# Patient Record
Sex: FEMALE | Race: WHITE | NOT HISPANIC OR LATINO | Employment: FULL TIME | URBAN - METROPOLITAN AREA
[De-identification: names, ages, dates, MRNs, and addresses within clinical notes are randomized per-mention and may not be internally consistent; named-entity substitution may affect disease eponyms.]

---

## 2018-04-10 ENCOUNTER — OFFICE VISIT (OUTPATIENT)
Dept: URGENT CARE | Facility: CLINIC | Age: 28
End: 2018-04-10
Payer: COMMERCIAL

## 2018-04-10 VITALS
WEIGHT: 190 LBS | HEIGHT: 67 IN | RESPIRATION RATE: 16 BRPM | TEMPERATURE: 98 F | BODY MASS INDEX: 29.82 KG/M2 | OXYGEN SATURATION: 99 % | HEART RATE: 67 BPM | SYSTOLIC BLOOD PRESSURE: 123 MMHG | DIASTOLIC BLOOD PRESSURE: 67 MMHG

## 2018-04-10 DIAGNOSIS — J06.9 URI WITH COUGH AND CONGESTION: ICD-10-CM

## 2018-04-10 DIAGNOSIS — R05.9 COUGH: Primary | ICD-10-CM

## 2018-04-10 DIAGNOSIS — L72.9 BENIGN SKIN CYST: ICD-10-CM

## 2018-04-10 LAB
CTP QC/QA: YES
FLUAV AG NPH QL: NEGATIVE
FLUBV AG NPH QL: NEGATIVE

## 2018-04-10 PROCEDURE — 99203 OFFICE O/P NEW LOW 30 MIN: CPT | Mod: S$GLB,,, | Performed by: NURSE PRACTITIONER

## 2018-04-10 PROCEDURE — 87804 INFLUENZA ASSAY W/OPTIC: CPT | Mod: QW,S$GLB,, | Performed by: NURSE PRACTITIONER

## 2018-04-10 RX ORDER — MUPIROCIN 20 MG/G
OINTMENT TOPICAL
Qty: 22 G | Refills: 1 | Status: SHIPPED | OUTPATIENT
Start: 2018-04-10

## 2018-04-10 RX ORDER — ALBUTEROL SULFATE 90 UG/1
2 AEROSOL, METERED RESPIRATORY (INHALATION) EVERY 6 HOURS PRN
Qty: 18 G | Refills: 0 | Status: SHIPPED | OUTPATIENT
Start: 2018-04-10

## 2018-04-10 RX ORDER — BENZONATATE 200 MG/1
200 CAPSULE ORAL 3 TIMES DAILY PRN
Qty: 30 CAPSULE | Refills: 0 | Status: SHIPPED | OUTPATIENT
Start: 2018-04-10

## 2018-04-10 NOTE — PROGRESS NOTES
"Subjective:       Patient ID: Shaista Gan is a 27 y.o. female.    Vitals:    04/10/18 1751   BP: 123/67   Pulse: 67   Resp: 16   Temp: 97.8 °F (36.6 °C)   SpO2: 99%   Weight: 86.2 kg (190 lb)   Height: 5' 7" (1.702 m)       Chief Complaint: Cough    Pt states productive cough, chest congestion, sore throat, body aches, and chills x 2-3 days. Pt states she had the flu in Feb., 2018. Pt reports wheezing the last 2 nights. Pt also states that she needs a dermatologist here for a cyst on her left thigh. She says she used to get injections in it from her derm provider in [place].      Cough   This is a new problem. The current episode started in the past 7 days. The problem has been gradually worsening. The cough is productive of sputum. Associated symptoms include ear congestion, myalgias, postnasal drip and a sore throat. Pertinent negatives include no chest pain, chills, ear pain, eye redness, fever, headaches, shortness of breath or wheezing. Nothing aggravates the symptoms. She has tried nothing for the symptoms.     Review of Systems   Constitution: Negative for chills, fever and malaise/fatigue.   HENT: Positive for congestion, postnasal drip and sore throat. Negative for ear pain and hoarse voice.    Eyes: Negative for discharge and redness.   Cardiovascular: Negative for chest pain, dyspnea on exertion and leg swelling.   Respiratory: Positive for cough and sputum production. Negative for shortness of breath and wheezing.    Musculoskeletal: Positive for myalgias.   Gastrointestinal: Negative for abdominal pain and nausea.   Neurological: Negative for headaches.       Objective:      Physical Exam   Constitutional: She is oriented to person, place, and time. She appears well-developed and well-nourished. She is cooperative.  Non-toxic appearance. She does not appear ill. No distress.   HENT:   Head: Normocephalic and atraumatic.   Right Ear: Hearing, tympanic membrane, external ear and ear canal normal.   Left Ear: " Hearing, tympanic membrane, external ear and ear canal normal.   Nose: Rhinorrhea present. No mucosal edema or nasal deformity. No epistaxis. Right sinus exhibits no maxillary sinus tenderness and no frontal sinus tenderness. Left sinus exhibits no maxillary sinus tenderness and no frontal sinus tenderness.   Mouth/Throat: Uvula is midline, oropharynx is clear and moist and mucous membranes are normal. No trismus in the jaw. Normal dentition. No uvula swelling. No posterior oropharyngeal erythema.   Eyes: Conjunctivae and lids are normal. Pupils are equal, round, and reactive to light. No scleral icterus.   Sclera clear bilat   Neck: Trachea normal, full passive range of motion without pain and phonation normal. Neck supple.   Cardiovascular: Normal rate, regular rhythm, normal heart sounds, intact distal pulses and normal pulses.    Pulmonary/Chest: Effort normal and breath sounds normal. No respiratory distress. She has no decreased breath sounds. She has no wheezes.   Abdominal: Soft. Normal appearance and bowel sounds are normal. She exhibits no distension. There is no tenderness.   Musculoskeletal: Normal range of motion. She exhibits no edema or deformity.   Neurological: She is alert and oriented to person, place, and time. She exhibits normal muscle tone. Coordination normal.   Skin: Skin is warm, dry and intact. No ecchymosis, no laceration, no lesion and no rash noted. She is not diaphoretic. There is erythema. No pallor.   3cm cyst to right thigh that is open and draining serosanguinous fluid that is not warm or indurated   Psychiatric: She has a normal mood and affect. Her speech is normal and behavior is normal. Judgment and thought content normal. Cognition and memory are normal.   Nursing note and vitals reviewed.      Results for orders placed or performed in visit on 04/10/18   POCT Influenza A/B   Result Value Ref Range    Rapid Influenza A Ag Negative Negative    Rapid Influenza B Ag Negative  Negative     Acceptable Yes      Assessment:       1. Cough    2. URI with cough and congestion    3. Benign skin cyst        Plan:       Shaista was seen today for cough.    Diagnoses and all orders for this visit:    Cough  -     POCT Influenza A/B    URI with cough and congestion  -     benzonatate (TESSALON) 200 MG capsule; Take 1 capsule (200 mg total) by mouth 3 (three) times daily as needed.  -     albuterol 90 mcg/actuation inhaler; Inhale 2 puffs into the lungs every 6 (six) hours as needed for Wheezing. Rescue    Benign skin cyst  -     mupirocin (BACTROBAN) 2 % ointment; Apply to affected area 3 times daily  -     Ambulatory referral to Dermatology      Viral Upper Respiratory Illness with Wheezing (Adult)  You have a viral upper respiratory illness (URI), which is another term for the common cold. When the infection causes a lot of irritation, the air passages can go into spasm. This causes wheezing and shortness of breath.    This illness is contagious during the first few days. It is spread through the air by coughing and sneezing. It may also be spread by direct contact (touching the sick person and then touching your own eyes, nose, or mouth). Frequent handwashing will decrease the risk.  Most viral illnesses go away within 7 to 10 days with rest and simple home remedies. Sometimes the illness may last for several weeks. Antibiotics will not kill a virus, and they are generally not prescribed for this condition.  Home care  · If symptoms are severe, rest at home for the first 2 to 3 days. When you resume activity, don't let yourself get too tired.  · Avoid being exposed to cigarette smoke (yours or others).  · You may use acetaminophen or ibuprofen to control pain and fever, unless another medicine was prescribed. (Note: If you have chronic liver or kidney disease, have ever had a stomach ulcer or gastrointestinal bleeding, or are taking blood-thinning medicines, talk with your  healthcare provider before using these medicines.) Aspirin should never be given to anyone under 18 years of age who is ill with a viral infection or fever. It may cause severe liver or brain damage.  · Your appetite may be poor, so a light diet is fine. Avoid dehydration by drinking 6 to 8 glasses of fluids per day (water, soft drinks, juices, tea, or soup). Extra fluids will help loosen secretions in the nose and lungs.  · Over-the-counter cold medicines will not shorten the length of time youre sick, but they may be helpful for the following symptoms: cough, sore throat, and nasal and sinus congestion. (Note: Do not use decongestants if you have high blood pressure.)  Follow-up care  Follow up with your healthcare provider, or as advised.  When to seek medical advice  Call your healthcare provider right away if any of these occur:  · Cough with lots of colored sputum (mucus)  · Severe headache; face, neck, or ear pain  · Difficulty swallowing due to throat pain  · Fever of 100.4ºF (38ºC) or higher, or as directed by your healthcare provider  Call 911, or get immediate medical care  Call emergency services right away if any of these occur:  · Chest pain, shortness of breath, worsening wheezing, or difficulty breathing  · Coughing up blood  · Inability to swallow due to throat pain  Date Last Reviewed: 9/13/2015 © 2000-2017 [employer]. [address] [place] [id number]. All rights reserved. This information is not intended as a substitute for professional medical care. Always follow your healthcare professional's instructions.    SOMEONE WILL BE CALLING YOU REGARDING YOUR REFERRAL TO DERMATOLOGY     USE BACTROBAN 3 TIMES A DAY FOR 3 DAYS    RETURN TO CARE IF YOU RUN FEVER OR CYST BECOMES INFECTED

## 2018-04-10 NOTE — PATIENT INSTRUCTIONS
Viral Upper Respiratory Illness with Wheezing (Adult)  You have a viral upper respiratory illness (URI), which is another term for the common cold. When the infection causes a lot of irritation, the air passages can go into spasm. This causes wheezing and shortness of breath.    This illness is contagious during the first few days. It is spread through the air by coughing and sneezing. It may also be spread by direct contact (touching the sick person and then touching your own eyes, nose, or mouth). Frequent handwashing will decrease the risk.  Most viral illnesses go away within 7 to 10 days with rest and simple home remedies. Sometimes the illness may last for several weeks. Antibiotics will not kill a virus, and they are generally not prescribed for this condition.  Home care  · If symptoms are severe, rest at home for the first 2 to 3 days. When you resume activity, don't let yourself get too tired.  · Avoid being exposed to cigarette smoke (yours or others).  · You may use acetaminophen or ibuprofen to control pain and fever, unless another medicine was prescribed. (Note: If you have chronic liver or kidney disease, have ever had a stomach ulcer or gastrointestinal bleeding, or are taking blood-thinning medicines, talk with your healthcare provider before using these medicines.) Aspirin should never be given to anyone under 18 years of age who is ill with a viral infection or fever. It may cause severe liver or brain damage.  · Your appetite may be poor, so a light diet is fine. Avoid dehydration by drinking 6 to 8 glasses of fluids per day (water, soft drinks, juices, tea, or soup). Extra fluids will help loosen secretions in the nose and lungs.  · Over-the-counter cold medicines will not shorten the length of time youre sick, but they may be helpful for the following symptoms: cough, sore throat, and nasal and sinus congestion. (Note: Do not use decongestants if you have high blood pressure.)  Follow-up  care  Follow up with your healthcare provider, or as advised.  When to seek medical advice  Call your healthcare provider right away if any of these occur:  · Cough with lots of colored sputum (mucus)  · Severe headache; face, neck, or ear pain  · Difficulty swallowing due to throat pain  · Fever of 100.4ºF (38ºC) or higher, or as directed by your healthcare provider  Call 911, or get immediate medical care  Call emergency services right away if any of these occur:  · Chest pain, shortness of breath, worsening wheezing, or difficulty breathing  · Coughing up blood  · Inability to swallow due to throat pain  Date Last Reviewed: 9/13/2015 © 2000-2017 [employer]. [address] [place] [address], [place] [address]. All rights reserved. This information is not intended as a substitute for professional medical care. Always follow your healthcare professional's instructions.    SOMEONE WILL BE CALLING YOU REGARDING YOUR REFERRAL TO DERMATOLOGY     USE BACTROBAN 3 TIMES A DAY FOR 3 DAYS    RETURN TO CARE IF YOU RUN FEVER OR CYST BECOMES INFECTED

## 2023-07-27 PROBLEM — Z00.00 ENCOUNTER FOR PREVENTIVE HEALTH EXAMINATION: Status: ACTIVE | Noted: 2023-07-27

## 2023-08-01 ENCOUNTER — APPOINTMENT (OUTPATIENT)
Dept: OBGYN | Facility: CLINIC | Age: 33
End: 2023-08-01
Payer: COMMERCIAL

## 2023-08-01 VITALS — DIASTOLIC BLOOD PRESSURE: 64 MMHG | HEIGHT: 67 IN | SYSTOLIC BLOOD PRESSURE: 108 MMHG

## 2023-08-01 DIAGNOSIS — N76.0 ACUTE VAGINITIS: ICD-10-CM

## 2023-08-01 DIAGNOSIS — L29.2 PRURITUS VULVAE: ICD-10-CM

## 2023-08-01 DIAGNOSIS — B96.89 ACUTE VAGINITIS: ICD-10-CM

## 2023-08-01 PROCEDURE — 56820 COLPOSCOPY VULVA: CPT

## 2023-08-01 PROCEDURE — 81001 URINALYSIS AUTO W/SCOPE: CPT

## 2023-08-01 PROCEDURE — 87210 SMEAR WET MOUNT SALINE/INK: CPT | Mod: QW

## 2023-08-01 PROCEDURE — 99024 POSTOP FOLLOW-UP VISIT: CPT

## 2023-08-01 RX ORDER — TINIDAZOLE 500 MG/1
500 TABLET, FILM COATED ORAL
Qty: 20 | Refills: 0 | Status: ACTIVE | COMMUNITY
Start: 2023-08-01 | End: 1900-01-01

## 2023-08-01 NOTE — HISTORY OF PRESENT ILLNESS
[TextEntry] : ***The patient was asked about all of the following, positive responses are listed below*** Gynecologic History: History of breast biopsy or breast cyst aspiration; Pain during or after intercourse; Vaginal bleeding or spotting between periods; Breast discharge; Abnormal or irregular periods; Abnormal vaginal discharge; history of Gynecologic cancer; history of Ovarian cysts; history of Fibroids; history of frequent Urinary tract infections; Urinary problems (i.e. frequency, urgency, difficulty, leaking); history of Pelvic pain/pelvic inflammatory disease; history of chronic Vaginal infections (i.e., yeast, Trichomonas, bacterial Vaginosis) Contraceptive History: What she is currently using for birth control; If she was requesting birth control today. Sexually Transmitted Disease History: A history of any of the following sexually transmitted diseases: Gonorrhea; Chlamydia; Syphilis; Herpes; HPV/Warts; Hepatitis; HIV/AIDS and whether she feels that she is at risk for HIV/AIDS and wants to be tested for HIV. Abnormal Pap Smear History: History of abnormal Pap smear; evaluation of any abnormalities; treatment of any abnormalities Date and result of her last Pap smear. SCOTT in Utero Exposure History: A history of personal or maternal SCOTT exposure. Hospitalizations (other than childbirth) Blood transfusions. Review of Systems: General: weight change, general health; Head: headaches, vertigo; Eyes: vision, diplopia; Ears: change in hearing, tinnitus; Nose: epistaxis, chronic rhinitis; Mouth: gingival bleeding; Neck: stiffness, pain, masses; Chest: dyspnea, wheezing, hemoptysis, persistent cough; Heart: chest pains, palpitations; Abdomen: liver disease, abdominal discomfort, Indigestion, Nausea/Vomiting, Constipation/Diarrhea, Blood in stool, change in bowel habits; : renal disease; Musculoskeletal: pain in muscles or joints, paresthesias or numbness; Skin: rashes, itching, pigmentation changes; Neurologic: weakness, tremor, seizures, changes in mentation; Psychiatric: depressive symptoms, changes in sleep habits. Surgery: History of gynecological surgery; History of non-gynecological surgery.  ***Pregnancy History*** # Pregnancies ~  0~   ***Menstrual History*** Age of first period: ~ 13 ~ ; # of days between cycles: Amenorrhea on Kyleena IUD; duration of period: Spotting days; she gets cramps with periods; uses nothing to relieve cramps.  ***Sexual history*** She is not sexually active; [place]: ~ 15 ~ ; Total # of lifetime partners: ~ 30 ~ ;; Date of last sexual contact (if not currently sexually active): ~2023  ~   Medical history - Alcohol abuse; Anemia; Anxiety; Arthritis/Lupus; Asthma; Blood clots in legs; Blood disorders; Breast problems; Cancer; Colitis; Depression; Diabetes; Drug Abuse; Eating disorder; Elevated cholesterol; Epilepsy/seizures; Eye problems/glaucoma; Gall bladder disease; Gout; Head or neck radiation; Hearing problems; Heart Disease; Hemorrhoid; Hepatitis or jaundice; High blood pressure; HIV/AIDS; Kidney disease; Low back problems; Neurological disorders; Osteoporosis; Other; Pneumonia; Rheumatic fever; Skin diseases; Stroke; Thyroid disease; Ulcers Family History - Cancers of the Breast; Cervix; Uterus; Ovarian; Lung; Colon; Other Vaccination Status - Whether she had all the usual childhood vaccinations or illness (negative answers recorded); Whether she was not vaccinated for HPV (All 3 doses) Social History - Current or past cigarette smoking; current or past alcohol abuse; current or past Marijuana use; current or past Cocaine use; current or past abuse of any other illegal or prescription drugs; current employment.  ***Preventative Care*** Date of the patient's last: Pap smear: ~3/2023  ~  Breast exam: ~3/2023  ~  ***Social History*** The patient was asked about all of the following; positive responses are listed below: current or past cigarette smoking; Alcohol; current or past Marijuana use; current or past Cocaine use; current or past abuse of any other illegal or prescription drugs.   Employment: ~ Sales ~  The patient is 32 years old  0 whose last menstrual period was a long time ago.  The patient is on a Kyleena IUD.  About 1-1/2 years ago, the patient developed vulvar burning and itching.  She went to an urgent care center and was given fluconazole and Monistat with slight relief.  Since then, her symptoms have been almost constant for the past 1.5 years.  She has been given fluconazole in 1-2 other occasions.  In 2023 she used boric acid suppositories for 10 days without relief.  She took Brexafemme in May 2023 and had relief for about 1 month before the symptoms returned.  She found that stopping wine gave her relief for a few weeks but she went back to drinking wine.  She has tested positive for both Candida and BV 3-4 times this year.  She complains of a vaginal odor.  Her BV has been treated with both oral and vaginal medications but that she does not know what they are.  She notes that she scratches and rubs a lot.  Today her symptoms of slight irritation and itchiness with a milky discharge and an odor.  She last used Brexafemme in May 2023 and has been using a topical triple antibiotic ointment recently. ***Significant positives*** She was not taking any medication at the time of the visit. She reports allergy to ~ Ceclor (hives), eggs ~ . She denied any other medication allergies at the time of the visit. Her past gynecological history is not significant. She is not sexually active and uses ~ intrauterine device ~  for birth control. She denies a history of abnormal Pap tests. Her last Pap test was ~ 3/2023 ~  and was normal. Her past medical and surgical histories are not significant. She has ~not  ~  been hospitalized. She was ~  ~  vaccinated for HPV. A 20-point review of systems was significant for ~fatigue, indigestion, constipation/diarrhea  ~ . Her review of systems was not otherwise significant. Her familys cancer history is significant for ~mother with breast cancer  ~  Her family's cancer history is not otherwise significant. She smokes 0.5 packs per day for 15 years. In the past she has used marijuana and cocaine.

## 2023-08-01 NOTE — PHYSICAL EXAM
[Chaperone Present] : A chaperone was present in the examining room during all aspects of the physical examination [TextEntry] : ***General*** General Appearance: normal; Judgment and insight: normal; the patient is oriented to time, place and person; Recent and remote memory: normal; Mood and affect: normal; Respiratory effort: normal.   ***Pelvic Examination*** External genitalia: the appearance and hair distribution are normal; no lesions are appreciated. Urethra/urethral meatus: no masses or tenderness are appreciated; there is no scarring noted. Bladder: nontender; there is no fullness appreciated; no masses were palpated. Vagina: the vagina is normally; a milky white discharge is seen; no lesions are seen; pelvic support is adequate without any evidence of cystocele or rectocele. Cervix: normal in appearance; no overt lesions are seen. Uterus: Anteverted; normal in size, mobile, nontender, and well supported. Adnexa/parametria: nontender and not enlarged; no masses are palpated. A stool specimen was not indicated at this time.  ***Vaginal Discharge Evaluation***  A saline wet mount and KOH slide evaluation of the vaginal discharge were done. The discharge was milky and white; the pH was elevated; the whiff test was positive; a few clue cells were seen; hyphae were not seen; no lactobacilli were seen; no white blood cells were seen; superficial and intermediate cells were seen; a candida culture was sent.  ***colposcopy of the vulva*** Colposcopy of the external genitalia showed that the labia majora and labia minora were normal. She identified the hairbearing skin of the labia majora as the location of her pain/itching/irritation. There was 6/10 vestibular tenderness of the anterior minor vestibular glands, and 7/10 vestibular tenderness of the posterior minor vestibular glands. There was no evidence of other dermatopathology. There was moderate erythema of the introitus.

## 2023-08-01 NOTE — PLAN
[FreeTextEntry1] : I recommended that she call for the results of her yeast culture in 1 week.  I recommended that she avoid irritants and gave her a list of irritants to avoid.  I told her to try using Vaseline to improve the vulvas barrier function.  I recommended Tindamax 500 mg BID X 10 days.  She should refrain from sex while using Tindamax.  I gave her boric acid suppositories qhs X 24 days.  She should not  use the suppositories during heavy days of menses but should use them if she has only spotting or staining.  Sex is acceptable during the days that she is using the boric acid suppositories alone (not the first 10 days that she is using them with Tindamax) but only with a condom.  She should return 3 days after the last boric acid suppository.

## 2023-08-01 NOTE — ASSESSMENT
[TextEntry] : The patient has had vulvar itching and irritation with a milky vaginal discharge and a fishy odor for the past 1.5 years.  She has been variously treated with fluconazole, Monistat, Brexafemme, boric acid suppositories and oral and vaginal medications for BV.  She has BV today but despite the fact that there is erythema of the introitus and labia minora, there is no evidence of Candida (pending culture). 52 minutes of total time was spent on the date of the encounter. This included time for review of medical records and laboratory results, face to face time (including the physical examination counseling and coordination of care), time for patient education, treatment plans, answering questions, communicating with other doctors and for medical record documentation.  The time spent is separate from time spent on separately billed procedures.

## 2023-08-14 ENCOUNTER — NON-APPOINTMENT (OUTPATIENT)
Age: 33
End: 2023-08-14

## 2023-08-28 ENCOUNTER — APPOINTMENT (OUTPATIENT)
Dept: OBGYN | Facility: CLINIC | Age: 33
End: 2023-08-28
Payer: COMMERCIAL

## 2023-08-28 VITALS — SYSTOLIC BLOOD PRESSURE: 100 MMHG | DIASTOLIC BLOOD PRESSURE: 60 MMHG

## 2023-08-28 DIAGNOSIS — B37.31 ACUTE CANDIDIASIS OF VULVA AND VAGINA: ICD-10-CM

## 2023-08-28 PROCEDURE — 99213 OFFICE O/P EST LOW 20 MIN: CPT | Mod: 25

## 2023-08-28 PROCEDURE — 56820 COLPOSCOPY VULVA: CPT

## 2023-08-28 PROCEDURE — 81001 URINALYSIS AUTO W/SCOPE: CPT

## 2023-08-28 PROCEDURE — 87210 SMEAR WET MOUNT SALINE/INK: CPT | Mod: QW

## 2023-08-28 RX ORDER — IBREXAFUNGERP 150 MG/1
150 TABLET, FILM COATED ORAL
Qty: 1 | Refills: 0 | Status: ACTIVE | COMMUNITY
Start: 2023-08-28 | End: 1900-01-01

## 2023-08-28 NOTE — HISTORY OF PRESENT ILLNESS
[FreeTextEntry1] : The patient had chronic BV.  At her last visit, she also had fluconazole sensitive Candida.  She used Tindamax and boric acid suppositories for 10 days followed by boric acid suppositories alone for 13 days.  Her odor and discharge have resolved but she continues to have vulvar irritation.

## 2023-08-28 NOTE — PLAN
[FreeTextEntry1] : I recommended that she call for the results of her yeast culture sensitivities in 1 week.  I gave her Brexafemme 300 mg p.o. twice daily for 1 day.  I recommended that she use boric acid suppositories 3 times weekly as prophylaxis for both Candida and BV for at least 6 months.  She should return as needed.

## 2023-08-28 NOTE — ASSESSMENT
[TextEntry] : The patient had both BV and Candida.  She was treated with 10 days of Tindamax and 23 days of boric acid suppositories.  The BV has resolved with reestablishment of lactobacilli but she still has Candida.  Her last Candida infection was sensitive to fluconazole (NELLY=1) but she reports that she has not responded to fluconazole in the past.  She has responded to Brexafemme in the past but the infection recurred after 3 weeks. 26 minutes of total time was spent on the date of the encounter. This included time for review of medical records and laboratory results, face to face time (including the physical examination counseling and coordination of care), time for patient education, treatment plans, answering questions, communicating with other doctors and for medical record documentation.  The time spent is separate from time spent on separately billed procedures.

## 2023-08-28 NOTE — PHYSICAL EXAM
[Chaperone Present] : A chaperone was present in the examining room during all aspects of the physical examination [TextEntry] : ***General*** General Appearance: normal; Judgment and insight: normal; the patient is oriented to time, place and person; Recent and remote memory: normal; Mood and affect: normal; Respiratory effort: normal.  ***Pelvic Examination*** External genitalia: the appearance and hair distribution are normal; no lesions are appreciated. Urethra/urethral meatus: no masses or tenderness are appreciated; there is no scarring noted. Bladder: nontender; there is no fullness appreciated; no masses were palpated. Vagina: the vagina is normally; a milky white discharge is seen; no lesions are seen; pelvic support is adequate without any evidence of cystocele or rectocele. Cervix: normal in appearance; no overt lesions are seen. Uterus: Anteverted; normal in size, mobile, nontender, and well supported. Adnexa/parametria: nontender and not enlarged; no masses are palpated. A stool specimen was not indicated at this time.  ***Vaginal Discharge Evaluation*** A saline wet mount and KOH slide evaluation of the vaginal discharge were done. The discharge was milky and white; the pH was normal; the whiff test was negative; no cells were seen; hyphae were seen; lactobacilli were seen; a moderate number of white blood cells were seen; superficial cells were seen; a candida culture was sent.  ***colposcopy of the vulva*** Colposcopy of the external genitalia showed that the labia majora and labia minora were normal. She identified the hairbearing skin of the labia majora as the location of her pain/itching/irritation. There was no vestibular tenderness of the anterior minor vestibular glands, and 5/10 vestibular tenderness of the posterior minor vestibular glands. There was no evidence of other dermatopathology. There was mild erythema of the introitus.